# Patient Record
Sex: FEMALE | Race: WHITE | NOT HISPANIC OR LATINO | URBAN - METROPOLITAN AREA
[De-identification: names, ages, dates, MRNs, and addresses within clinical notes are randomized per-mention and may not be internally consistent; named-entity substitution may affect disease eponyms.]

---

## 2024-08-28 ENCOUNTER — TELEPHONE ENCOUNTER (OUTPATIENT)
Dept: URBAN - METROPOLITAN AREA CLINIC 96 | Facility: CLINIC | Age: 39
End: 2024-08-28

## 2024-08-30 ENCOUNTER — OFFICE VISIT (OUTPATIENT)
Dept: URBAN - METROPOLITAN AREA CLINIC 98 | Facility: CLINIC | Age: 39
End: 2024-08-30
Payer: COMMERCIAL

## 2024-08-30 ENCOUNTER — DASHBOARD ENCOUNTERS (OUTPATIENT)
Age: 39
End: 2024-08-30

## 2024-08-30 VITALS
DIASTOLIC BLOOD PRESSURE: 67 MMHG | SYSTOLIC BLOOD PRESSURE: 111 MMHG | HEIGHT: 62 IN | BODY MASS INDEX: 28.97 KG/M2 | WEIGHT: 157.4 LBS | TEMPERATURE: 97.7 F | HEART RATE: 77 BPM

## 2024-08-30 DIAGNOSIS — K30 INDIGESTION: ICD-10-CM

## 2024-08-30 DIAGNOSIS — R10.9 ABDOMINAL CRAMPS: ICD-10-CM

## 2024-08-30 DIAGNOSIS — R11.0 NAUSEA ALONE: ICD-10-CM

## 2024-08-30 PROBLEM — 162031009: Status: ACTIVE | Noted: 2024-08-30

## 2024-08-30 PROCEDURE — 99204 OFFICE O/P NEW MOD 45 MIN: CPT | Performed by: INTERNAL MEDICINE

## 2024-08-30 RX ORDER — FAMOTIDINE 40 MG/1
1 TABLET AT BEDTIME TABLET, FILM COATED ORAL ONCE A DAY
Qty: 30 | Refills: 1 | OUTPATIENT
Start: 2024-08-30

## 2024-08-30 NOTE — HPI-TODAY'S VISIT:
Ms. Cordova is a 38 yo F presenting for new patient visit for indigestion.  In the beginning of August she took Tums and Gas Ex, Pepto Bismol  Lasted for over 24 hours Then developed severe abdominal cramping and nausea Went to urgent care- went to hospital and had no obvious source of symptoms on CT scan In the last few days her indigestion has been somewhat improved, still having left upper pain and cramping, severe nausea.  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

## 2024-09-01 ENCOUNTER — WEB ENCOUNTER (OUTPATIENT)
Dept: URBAN - METROPOLITAN AREA CLINIC 98 | Facility: CLINIC | Age: 39
End: 2024-09-01

## 2024-09-06 ENCOUNTER — WEB ENCOUNTER (OUTPATIENT)
Dept: URBAN - METROPOLITAN AREA CLINIC 98 | Facility: CLINIC | Age: 39
End: 2024-09-06

## 2024-09-09 ENCOUNTER — LAB OUTSIDE AN ENCOUNTER (OUTPATIENT)
Dept: URBAN - METROPOLITAN AREA CLINIC 98 | Facility: CLINIC | Age: 39
End: 2024-09-09

## 2024-09-26 ENCOUNTER — OFFICE VISIT (OUTPATIENT)
Dept: URBAN - METROPOLITAN AREA CLINIC 91 | Facility: CLINIC | Age: 39
End: 2024-09-26
Payer: COMMERCIAL

## 2024-09-26 DIAGNOSIS — R93.421 ABNORMAL ULTRASOUND OF RIGHT KIDNEY: ICD-10-CM

## 2024-09-26 PROCEDURE — 76705 ECHO EXAM OF ABDOMEN: CPT | Performed by: INTERNAL MEDICINE

## 2024-09-27 ENCOUNTER — LAB OUTSIDE AN ENCOUNTER (OUTPATIENT)
Dept: URBAN - METROPOLITAN AREA CLINIC 98 | Facility: CLINIC | Age: 39
End: 2024-09-27

## 2024-09-27 ENCOUNTER — OFFICE VISIT (OUTPATIENT)
Dept: URBAN - METROPOLITAN AREA CLINIC 98 | Facility: CLINIC | Age: 39
End: 2024-09-27
Payer: COMMERCIAL

## 2024-09-27 VITALS — HEIGHT: 62 IN | BODY MASS INDEX: 29.08 KG/M2 | WEIGHT: 158 LBS

## 2024-09-27 DIAGNOSIS — R10.9 ABDOMINAL CRAMPS: ICD-10-CM

## 2024-09-27 DIAGNOSIS — R11.0 NAUSEA ALONE: ICD-10-CM

## 2024-09-27 DIAGNOSIS — K30 INDIGESTION: ICD-10-CM

## 2024-09-27 PROCEDURE — 99214 OFFICE O/P EST MOD 30 MIN: CPT | Performed by: INTERNAL MEDICINE

## 2024-09-27 RX ORDER — FAMOTIDINE 40 MG/1
1 TABLET AT BEDTIME TABLET, FILM COATED ORAL ONCE A DAY
Qty: 30 | Refills: 1 | OUTPATIENT

## 2024-09-27 RX ORDER — FAMOTIDINE 40 MG/1
1 TABLET AT BEDTIME TABLET, FILM COATED ORAL ONCE A DAY
Qty: 30 | Refills: 1 | Status: ACTIVE | COMMUNITY
Start: 2024-08-30

## 2024-09-27 NOTE — HPI-TODAY'S VISIT:
Ms. Cordova is a 40 yo F presenting for established patient visit for indigestion. Last visit 24.  still taking acid suppression medication which is making nausea better Abdominal pain is still present, mostly left sided Having soft-serve consistency stools Fatty foods make symtoms worse Stopped gluten  Initial visit 24: In the beginning of August she took Tums and Gas Ex, Pepto Bismol  Lasted for over 24 hours Then developed severe abdominal cramping and nausea Went to urgent care- went to hospital and had no obvious source of symptoms on CT scan In the last few days her indigestion has been somewhat improved, still having left upper pain and cramping, severe nausea.  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 I used video conferencing during the visit. The patient encounter is appropriate and reasonable under the circumstances given the patient's particular presentation at this time. The patient has been advised of the followin) the potential risks and limitations of this mode of treatment (including but not limited to the absence of in-person examination); 2) the right to refuse telehealth services at any point without affecting the right to future care; 3) the right to receive in-person services, included immediately after this consultation if an urgent need arises; 4) information, including identifiable images or information from this telehealth consult, will only be shared in accordance with HIPAA regulations. Any and all of the patient's and/or patient's family member's questions on this issue have been answered. The patient has verbally consented to be treated via telehealth services. The patient has also been advised to contact this office for worsening conditions or problems, and seek emergency medical treatment and/or call 911 if the patient deems either necessary. More than half of the face-to-face time used for counseling and coordination of care. Visit takes place in patient's home

## 2024-11-04 ENCOUNTER — OFFICE VISIT (OUTPATIENT)
Dept: URBAN - METROPOLITAN AREA TELEHEALTH 2 | Facility: TELEHEALTH | Age: 39
End: 2024-11-04

## 2024-11-20 ENCOUNTER — LAB OUTSIDE AN ENCOUNTER (OUTPATIENT)
Dept: URBAN - METROPOLITAN AREA CLINIC 98 | Facility: CLINIC | Age: 39
End: 2024-11-20

## 2024-12-06 ENCOUNTER — OFFICE VISIT (OUTPATIENT)
Dept: URBAN - METROPOLITAN AREA CLINIC 98 | Facility: CLINIC | Age: 39
End: 2024-12-06
Payer: COMMERCIAL

## 2024-12-06 ENCOUNTER — LAB OUTSIDE AN ENCOUNTER (OUTPATIENT)
Dept: URBAN - METROPOLITAN AREA CLINIC 98 | Facility: CLINIC | Age: 39
End: 2024-12-06

## 2024-12-06 VITALS
DIASTOLIC BLOOD PRESSURE: 78 MMHG | WEIGHT: 157 LBS | SYSTOLIC BLOOD PRESSURE: 111 MMHG | HEART RATE: 96 BPM | BODY MASS INDEX: 28.89 KG/M2 | HEIGHT: 62 IN | TEMPERATURE: 96.6 F

## 2024-12-06 DIAGNOSIS — R10.9 ABDOMINAL CRAMPS: ICD-10-CM

## 2024-12-06 DIAGNOSIS — K30 INDIGESTION: ICD-10-CM

## 2024-12-06 DIAGNOSIS — R11.0 NAUSEA ALONE: ICD-10-CM

## 2024-12-06 PROCEDURE — 99214 OFFICE O/P EST MOD 30 MIN: CPT | Performed by: INTERNAL MEDICINE

## 2024-12-06 RX ORDER — PANTOPRAZOLE SODIUM 40 MG/1
1 TABLET 1/2 TO 1 HOUR BEFORE MORNING MEAL TABLET, DELAYED RELEASE ORAL ONCE A DAY
Qty: 30 | Refills: 1 | OUTPATIENT
Start: 2024-12-06

## 2024-12-06 RX ORDER — FAMOTIDINE 40 MG/1
1 TABLET AT BEDTIME TABLET, FILM COATED ORAL ONCE A DAY
Qty: 30 | Refills: 1 | Status: ACTIVE | COMMUNITY

## 2024-12-06 NOTE — HPI-TODAY'S VISIT:
Ms. Cordova is a 38 yo F presenting for established patient visit for indigestion. Last visit 9/27/24.  The nausea is still present, happens when she wakes up and lasts throughout the days Occasionally worsens in the afternoon Worse after naps Anti - nausea medications, antacids, Tums have not helped  Still having lower abdominal cramping in her left abdomen She has excessive gas and flatulence throughout the day She has been constipated recently. Usually has numerous bowel movements per day  Initial visit 8/30/24: In the beginning of August she took Tums and Gas Ex, Pepto Bismol  Lasted for over 24 hours Then developed severe abdominal cramping and nausea Went to urgent care- went to hospital and had no obvious source of symptoms on CT scan In the last few days her indigestion has been somewhat improved, still having left upper pain and cramping, severe nausea.  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

## 2024-12-13 ENCOUNTER — CLAIMS CREATED FROM THE CLAIM WINDOW (OUTPATIENT)
Dept: URBAN - METROPOLITAN AREA CLINIC 4 | Facility: CLINIC | Age: 39
End: 2024-12-13
Payer: COMMERCIAL

## 2024-12-13 ENCOUNTER — OFFICE VISIT (OUTPATIENT)
Dept: URBAN - METROPOLITAN AREA SURGERY CENTER 18 | Facility: SURGERY CENTER | Age: 39
End: 2024-12-13
Payer: COMMERCIAL

## 2024-12-13 DIAGNOSIS — K30 INDIGESTION: ICD-10-CM

## 2024-12-13 DIAGNOSIS — R11.0 NAUSEA: ICD-10-CM

## 2024-12-13 DIAGNOSIS — K29.80 PEPTIC DUODENITIS: ICD-10-CM

## 2024-12-13 DIAGNOSIS — K29.80 DUODENITIS WITHOUT BLEEDING: ICD-10-CM

## 2024-12-13 DIAGNOSIS — K31.89 MUCOSAL ABNORMALITY OF STOMACH: ICD-10-CM

## 2024-12-13 DIAGNOSIS — K31.89 OTHER DISEASES OF STOMACH AND DUODENUM: ICD-10-CM

## 2024-12-13 PROCEDURE — 88305 TISSUE EXAM BY PATHOLOGIST: CPT | Performed by: PATHOLOGY

## 2024-12-13 PROCEDURE — 43239 EGD BIOPSY SINGLE/MULTIPLE: CPT | Performed by: INTERNAL MEDICINE

## 2024-12-13 PROCEDURE — 00731 ANES UPR GI NDSC PX NOS: CPT | Performed by: CASE MANAGER/CARE COORDINATOR

## 2024-12-13 RX ORDER — FAMOTIDINE 40 MG/1
1 TABLET AT BEDTIME TABLET, FILM COATED ORAL ONCE A DAY
Qty: 30 | Refills: 1 | Status: ACTIVE | COMMUNITY

## 2024-12-13 RX ORDER — PANTOPRAZOLE SODIUM 40 MG/1
1 TABLET 1/2 TO 1 HOUR BEFORE MORNING MEAL TABLET, DELAYED RELEASE ORAL ONCE A DAY
Qty: 30 | Refills: 1 | Status: ACTIVE | COMMUNITY
Start: 2024-12-06

## 2025-01-13 ENCOUNTER — TELEPHONE ENCOUNTER (OUTPATIENT)
Dept: URBAN - METROPOLITAN AREA CLINIC 98 | Facility: CLINIC | Age: 40
End: 2025-01-13

## 2025-01-13 PROBLEM — 197125005: Status: ACTIVE | Noted: 2025-01-13

## 2025-01-15 ENCOUNTER — TELEPHONE ENCOUNTER (OUTPATIENT)
Dept: URBAN - METROPOLITAN AREA CLINIC 98 | Facility: CLINIC | Age: 40
End: 2025-01-15

## 2025-01-16 ENCOUNTER — TELEPHONE ENCOUNTER (OUTPATIENT)
Dept: URBAN - METROPOLITAN AREA CLINIC 96 | Facility: CLINIC | Age: 40
End: 2025-01-16

## 2025-06-07 ENCOUNTER — WEB ENCOUNTER (OUTPATIENT)
Dept: URBAN - METROPOLITAN AREA CLINIC 98 | Facility: CLINIC | Age: 40
End: 2025-06-07

## 2025-06-19 ENCOUNTER — OFFICE VISIT (OUTPATIENT)
Dept: URBAN - METROPOLITAN AREA CLINIC 98 | Facility: CLINIC | Age: 40
End: 2025-06-19
Payer: COMMERCIAL

## 2025-06-19 DIAGNOSIS — K92.1 BLACK STOOL: ICD-10-CM

## 2025-06-19 DIAGNOSIS — K58.0 IRRITABLE BOWEL SYNDROME WITH DIARRHEA: ICD-10-CM

## 2025-06-19 PROCEDURE — 99214 OFFICE O/P EST MOD 30 MIN: CPT | Performed by: INTERNAL MEDICINE

## 2025-06-19 RX ORDER — PANTOPRAZOLE SODIUM 40 MG/1
1 TABLET 1/2 TO 1 HOUR BEFORE MORNING MEAL TABLET, DELAYED RELEASE ORAL ONCE A DAY
Qty: 30 | Refills: 1 | Status: ON HOLD | COMMUNITY
Start: 2024-12-06

## 2025-06-19 RX ORDER — FAMOTIDINE 40 MG/1
1 TABLET TABLET, FILM COATED ORAL ONCE A DAY
Qty: 30 | Refills: 1 | OUTPATIENT
Start: 2025-06-19

## 2025-06-19 RX ORDER — FAMOTIDINE 40 MG/1
1 TABLET AT BEDTIME TABLET, FILM COATED ORAL ONCE A DAY
Qty: 30 | Refills: 1 | Status: ON HOLD | COMMUNITY

## 2025-06-19 NOTE — HPI-TODAY'S VISIT:
Ms. Cordova is a 41 yo F presenting for established patient visit for indigestion. Last visit 12/6/24.   Pulled a muscle in her back mid-May. Had bad back pain.  Went to urgent care and started taking 600 ibuprofen and cyclobenzaprine.  Had 2 black solid BMs with a red rim x 2 on 6/7/25.  No abdominal pain. Having brown, formed BMs 1-2 per day.  Stopped eating dairy and limited foods that triggered cramping and nausea.  Feels her SIBO is better.  Initial visit 8/30/24: In the beginning of August she took Tums and Gas Ex, Pepto Bismol  Lasted for over 24 hours Then developed severe abdominal cramping and nausea Went to urgent care- went to hospital and had no obvious source of symptoms on CT scan In the last few days her indigestion has been somewhat improved, still having left upper pain and cramping, severe nausea.  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

## 2025-06-28 LAB
ALBUMIN: 4.4
ALKALINE PHOSPHATASE: 137
ALT (SGPT): 59
AST (SGOT): 26
BASO (ABSOLUTE): 0
BASOS: 0
BILIRUBIN, TOTAL: 0.4
BUN/CREATININE RATIO: 16
BUN: 16
CALCIUM: 10.2
CARBON DIOXIDE, TOTAL: 23
CHLORIDE: 101
CHOLESTEROL, TOTAL: 195
CREATININE: 1.01
EGFR: 73
EOS (ABSOLUTE): 0.2
EOS: 2
GLOBULIN, TOTAL: 2.8
GLUCOSE: 86
HDL CHOLESTEROL: 66
HEMATOCRIT: 43.7
HEMATOLOGY COMMENTS:: (no result)
HEMOGLOBIN A1C: 5.3
HEMOGLOBIN: 13.7
IMMATURE CELLS: (no result)
IMMATURE GRANS (ABS): 0
IMMATURE GRANULOCYTES: 0
LDL CALC COMMENT:: (no result)
LDL CHOL CALC (NIH): 103
LYMPHS (ABSOLUTE): 2
LYMPHS: 23
MCH: 27.5
MCHC: 31.4
MCV: 88
MONOCYTES(ABSOLUTE): 0.6
MONOCYTES: 7
NEUTROPHILS (ABSOLUTE): 6
NEUTROPHILS: 68
NRBC: (no result)
PLATELETS: 358
POTASSIUM: 4.5
PROTEIN, TOTAL: 7.2
RBC: 4.98
RDW: 15.2
SODIUM: 140
TRIGLYCERIDES: 149
TSH: 1.39
VLDL CHOLESTEROL CAL: 26
WBC: 8.8

## 2025-06-30 ENCOUNTER — LAB OUTSIDE AN ENCOUNTER (OUTPATIENT)
Dept: URBAN - METROPOLITAN AREA CLINIC 98 | Facility: CLINIC | Age: 40
End: 2025-06-30

## 2025-06-30 ENCOUNTER — TELEPHONE ENCOUNTER (OUTPATIENT)
Dept: URBAN - METROPOLITAN AREA CLINIC 98 | Facility: CLINIC | Age: 40
End: 2025-06-30